# Patient Record
(demographics unavailable — no encounter records)

---

## 2025-01-23 NOTE — ASSESSMENT
[FreeTextEntry1] :  A/P: 72 year old female here for her f/u medical weight loss appointment. PMHx is significant for class 3 obesity, HTN, cardiac palpitations, JEREMIAH, HLD, JEREMIAH, migraines, MVP, T2DM, metabolic syndrome, depressed mood and anxiety, overactive bladder, TMJ, MDD, elevated ALT, and OA of the knees.   -Continue to work together with an interdisciplinary team to help achieve their weight loss goals.  -Continue Mounjaro. Will plan to increase dose to 10mg from 7.5 mg.  Rx sent.  -Recommend that she hold the metformin for now.  Per Yoseph's recommendations, will continue protein shakes to help to increase her protein intake.   -Continue to increase her physical activity as tolerated. Goal: 10 min of leg and arm lifts on the chair daily -Depressed mood/anxiety/insomnia - already on 100 mg of sertraline. Is now also on bupropion. Is tolerating well. Continues to monitor BPs while on it. Continue 150mg daily. Pending response, may work to titrate Zoloft down. Again, strongly encouraged her to establish care with a mental health professional.  -JEREMIAH/TMJ - is working with a dentist to help manage this given that she did not tolerate the CPAP mask. Scheduled for next week. Metabolic syndrome/HLD - On simvastatin and monitor with weight loss -HTN/hx of palpitations - currently on atenolol GERD - on omeprazole -Overactive bladder - continue gemteza polyarticular OA - monitor with weight loss Health maintenance - already has an endoscopy, colonoscopy and mammo. Will work to schedule bone density.   F/U with RD per routine F/U with MD in ~6-8 weeks  Patient was seen by Dr. Kaye on 1/23/25. Time spent with patient was greater than  minutes, including chart review, time spent with patient, and charting.

## 2025-01-23 NOTE — PHYSICAL EXAM
[No Rash or Lesion] : No rash or lesion [Alert] : alert [Oriented to Person] : oriented to person [Oriented to Place] : oriented to place [Oriented to Time] : oriented to time [Calm] : calm [de-identified] :  Well appearing female in NAD [de-identified] :  CNs II-XII grossly intact [de-identified] :  No increased WOB

## 2025-01-23 NOTE — HISTORY OF PRESENT ILLNESS
[de-identified] : Referred by: Dr. Solitario  HPI: 72 year old female here for her f/u medical weight loss appointment. PMHx is significant for class 3 obesity, HTN, cardiac palpitations, JEREMIAH, HLD, JEREMIAH, migraines, MVP, T2DM, metabolic syndrome, depressed mood and anxiety, overactive bladder, TMJ, MDD, elevated ALT, and OA of the knees.   Weight History: Highest Weight: 275 lbs (2024) Lowest Weight: 165 lbs ()  Weight at [facility]: 267 lbs/45.12 kg/m2 (2024) Current weight:   Has continued on Mounjaro. Is now on 7.5 mg of Mounjaro. Continues to tolerate overall well, aside from some occ constipation.  and reports decreased cravings for sweets.   Reports that she is happy with her weight loss, but does note that her skin is becoming "wrinkly".   Continues to work on increasing her physical activity.   Continues to work on increasing her protein intake. Does admit that is often not hungry and has to remember to eat. '  Continues to take her MVI and vitamin D.  Recently met with Pulm. Reports improved PFTs. Per her report, pulcheikh saw "something suspicious on her lung" - has a f/u CT scheduled for further evaluation.   Continues taking bupropion. Has been monitoring her BP at home. Most recent BP is 130/70. Continues to struggle with anxiety. Had booked a trip to [place] for the , then cancelled it. Has booked a trip to [place] for September. Hopes to be able to do it.   Continues on the metformin at this time. Continues to tolerate.  Review of Systems is notable for: Hair thinning Ringing in B/L ears (hx of "clogged ears") Chronic cough s/p URI --> chest discomfort Feet, hips, knees "are killing her" R>L +SOB with exertion sciatica pain Stairs "bother her" Occ "stabbing" sensation in her chest associated with her esophageal spasms +HAs Constipation  back pain  Previous Weight Loss Efforts: Has been struggling with her weight for years. Weight had been stable in the 180s for years. Used to be an . Weight gain has been precipitated by transitions in life. Would gain weight in between marriages, which has been further exacerbated over the last several years by COVID, the death of loved ones and/or caring for loved one with significant health needs. Most recently her JUAN J passed away on Dec 2nd.  ~2 yrs ago, her  ("the love of her life") passed away. She has become increasingly depressed --> fatigue --> excessive sleep --> decreased physical activity --> increase in weight --> increase in feelings of shame --> further weight gain. Now has anxiety to go out because she is very unhappy by how she looks. She has limited her social interactions because she does not want anyone to see her. Has gained at least 15 lbs since Caroline. Has also neglected her health over the past 2 years and is just now getting back on track with preventive care.   Previous use of AOMs: Has never used a prescription Rx for weight loss, but did use an OTC medicine -"phen-Kari". Discontinued because it made her jittery and gave her palpitations.   Has also tried several diets in the past with minimal success vs inability to maintain weight loss for the long term: Ghazala [employer] WW's - has lost 20-30 lbs Low fat Atkins Portion control  Medications that may have contributed to weight gain: Cannot recall any  Pertinent Labs: none to review at this time  Movement: "Doesn't move" - "doesn't get off the couch". Also limited by knee pain  Eating behaviors: Loves sweets (Girl  cookies) Thinks about food a lot Doesn't like/want to cook for a single person Will go long periods of time without eating Loves nuts  SocHx:    3x No children Retired  Former smoker - Quit in the  (used to smoke ~1ppd)  Sleep: Irregular sleep pattern Has a dx of JEREMIAH and was given a CPAP - does not use regularly Is up to 3-4AM, sometimes 5 AM Grinds her teeth at night - being seen by Dr. Levi Brown Possible snoring  Mental Health: Now gets anxiety about going out because she is embarrassed by how she looks Has been struggling in recent days/weeks because of the anniversary of the deaths of several of her loved ones: Father passed away - 40 yrs ago yesterday, Mother  on  Had worked with a therapist when she was taking care of her  Her PCP, Dr. Solitario, has also recommended that she meet with a psychiatrist  Reproductive/Preventive Screenings: Last mammo 2 years ago Colonoscopy > 10 yrs ago Has upcoming appts already scheduled for her mammo, endoscopy and colonoscopy